# Patient Record
Sex: FEMALE | Race: WHITE | NOT HISPANIC OR LATINO | URBAN - METROPOLITAN AREA
[De-identification: names, ages, dates, MRNs, and addresses within clinical notes are randomized per-mention and may not be internally consistent; named-entity substitution may affect disease eponyms.]

---

## 2024-06-25 ENCOUNTER — HOSPITAL ENCOUNTER (EMERGENCY)
Facility: HOSPITAL | Age: 19
Discharge: HOME | End: 2024-06-25
Attending: EMERGENCY MEDICINE | Admitting: EMERGENCY MEDICINE
Payer: COMMERCIAL

## 2024-06-25 ENCOUNTER — APPOINTMENT (EMERGENCY)
Dept: RADIOLOGY | Facility: HOSPITAL | Age: 19
End: 2024-06-25
Payer: COMMERCIAL

## 2024-06-25 VITALS
OXYGEN SATURATION: 99 % | RESPIRATION RATE: 20 BRPM | DIASTOLIC BLOOD PRESSURE: 64 MMHG | WEIGHT: 126.54 LBS | HEART RATE: 82 BPM | SYSTOLIC BLOOD PRESSURE: 118 MMHG | TEMPERATURE: 98.6 F

## 2024-06-25 DIAGNOSIS — R01.1 NEWLY RECOGNIZED HEART MURMUR: ICD-10-CM

## 2024-06-25 DIAGNOSIS — R07.89 ATYPICAL CHEST PAIN: Primary | ICD-10-CM

## 2024-06-25 LAB
ANION GAP SERPL CALCULATED.3IONS-SCNC: 9 MMOL/L (ref 4–13)
BASOPHILS # BLD AUTO: 0.03 THOUSANDS/ÂΜL (ref 0–0.1)
BASOPHILS NFR BLD AUTO: 0 % (ref 0–1)
BILIRUB UR QL STRIP: NEGATIVE
BUN SERPL-MCNC: 10 MG/DL (ref 5–25)
CALCIUM SERPL-MCNC: 9.3 MG/DL (ref 8.4–10.2)
CARDIAC TROPONIN I PNL SERPL HS: <2 NG/L
CHLORIDE SERPL-SCNC: 103 MMOL/L (ref 96–108)
CLARITY UR: CLEAR
CO2 SERPL-SCNC: 26 MMOL/L (ref 21–32)
COLOR UR: YELLOW
CREAT SERPL-MCNC: 0.76 MG/DL (ref 0.6–1.3)
D DIMER PPP FEU-MCNC: <0.27 UG/ML FEU
EOSINOPHIL # BLD AUTO: 0.11 THOUSAND/ÂΜL (ref 0–0.61)
EOSINOPHIL NFR BLD AUTO: 2 % (ref 0–6)
ERYTHROCYTE [DISTWIDTH] IN BLOOD BY AUTOMATED COUNT: 11.4 % (ref 11.6–15.1)
EXT PREGNANCY TEST URINE: NEGATIVE
EXT. CONTROL: NORMAL
GFR SERPL CREATININE-BSD FRML MDRD: 112 ML/MIN/1.73SQ M
GLUCOSE SERPL-MCNC: 86 MG/DL (ref 65–140)
GLUCOSE UR STRIP-MCNC: NEGATIVE MG/DL
HCT VFR BLD AUTO: 40.2 % (ref 34.8–46.1)
HGB BLD-MCNC: 14.2 G/DL (ref 11.5–15.4)
HGB UR QL STRIP.AUTO: NEGATIVE
IMM GRANULOCYTES # BLD AUTO: 0.02 THOUSAND/UL (ref 0–0.2)
IMM GRANULOCYTES NFR BLD AUTO: 0 % (ref 0–2)
KETONES UR STRIP-MCNC: NEGATIVE MG/DL
LEUKOCYTE ESTERASE UR QL STRIP: NEGATIVE
LYMPHOCYTES # BLD AUTO: 2.3 THOUSANDS/ÂΜL (ref 0.6–4.47)
LYMPHOCYTES NFR BLD AUTO: 32 % (ref 14–44)
MCH RBC QN AUTO: 32.1 PG (ref 26.8–34.3)
MCHC RBC AUTO-ENTMCNC: 35.3 G/DL (ref 31.4–37.4)
MCV RBC AUTO: 91 FL (ref 82–98)
MONOCYTES # BLD AUTO: 0.8 THOUSAND/ÂΜL (ref 0.17–1.22)
MONOCYTES NFR BLD AUTO: 11 % (ref 4–12)
NEUTROPHILS # BLD AUTO: 4.02 THOUSANDS/ÂΜL (ref 1.85–7.62)
NEUTS SEG NFR BLD AUTO: 55 % (ref 43–75)
NITRITE UR QL STRIP: NEGATIVE
NRBC BLD AUTO-RTO: 0 /100 WBCS
PH UR STRIP.AUTO: 6.5 [PH] (ref 4.5–8)
PLATELET # BLD AUTO: 225 THOUSANDS/UL (ref 149–390)
PMV BLD AUTO: 10.9 FL (ref 8.9–12.7)
POTASSIUM SERPL-SCNC: 3.3 MMOL/L (ref 3.5–5.3)
PROT UR STRIP-MCNC: NEGATIVE MG/DL
RBC # BLD AUTO: 4.43 MILLION/UL (ref 3.81–5.12)
SODIUM SERPL-SCNC: 138 MMOL/L (ref 135–147)
SP GR UR STRIP.AUTO: 1.01 (ref 1–1.03)
UROBILINOGEN UR QL STRIP.AUTO: 0.2 E.U./DL
WBC # BLD AUTO: 7.28 THOUSAND/UL (ref 4.31–10.16)

## 2024-06-25 PROCEDURE — 93005 ELECTROCARDIOGRAM TRACING: CPT

## 2024-06-25 PROCEDURE — 84484 ASSAY OF TROPONIN QUANT: CPT | Performed by: EMERGENCY MEDICINE

## 2024-06-25 PROCEDURE — 85379 FIBRIN DEGRADATION QUANT: CPT | Performed by: EMERGENCY MEDICINE

## 2024-06-25 PROCEDURE — 80048 BASIC METABOLIC PNL TOTAL CA: CPT | Performed by: EMERGENCY MEDICINE

## 2024-06-25 PROCEDURE — 36415 COLL VENOUS BLD VENIPUNCTURE: CPT | Performed by: EMERGENCY MEDICINE

## 2024-06-25 PROCEDURE — 71046 X-RAY EXAM CHEST 2 VIEWS: CPT

## 2024-06-25 PROCEDURE — 81025 URINE PREGNANCY TEST: CPT | Performed by: EMERGENCY MEDICINE

## 2024-06-25 PROCEDURE — 99285 EMERGENCY DEPT VISIT HI MDM: CPT | Performed by: EMERGENCY MEDICINE

## 2024-06-25 PROCEDURE — 85025 COMPLETE CBC W/AUTO DIFF WBC: CPT | Performed by: EMERGENCY MEDICINE

## 2024-06-25 PROCEDURE — 81003 URINALYSIS AUTO W/O SCOPE: CPT

## 2024-06-25 NOTE — ED PROVIDER NOTES
"History  Chief Complaint   Patient presents with    Chest Pain     Chest pain for the past week that is intermittent and pt describes as pressure and sometimes sharp. Hx of same previously and prescribed steroid due to bronchial thickening and airway inflammation     A 20 yo female with no significant pmhx; presents with left sided chest pain for the past week.  Pain is colicky in nature, she has been unable to identify any exacerbating factors.  Pt denies associated fever, chills, cough, SOB, abd pain, N/V/D, peripheral edema and rashes.  Pt reports this has been an ongoing issue over the past several months.  Pt states she was initially seen a few months ago, diagnosed with \"bronchial thickening and airway inflammation\".  She was treated with a course of steroids and Abx, reporting some improvement until recently.        History provided by:  Patient and medical records      None       Past Medical History:   Diagnosis Date    Asthma        History reviewed. No pertinent surgical history.    History reviewed. No pertinent family history.  I have reviewed and agree with the history as documented.    E-Cigarette/Vaping    E-Cigarette Use Never User      E-Cigarette/Vaping Substances    Nicotine No     THC No     CBD No     Flavoring No     Other No     Unknown No      Social History     Tobacco Use    Smoking status: Never    Smokeless tobacco: Never   Vaping Use    Vaping status: Never Used   Substance Use Topics    Alcohol use: Yes     Alcohol/week: 3.0 standard drinks of alcohol     Types: 3 Cans of beer per week     Comment: mothly    Drug use: Not Currently       Review of Systems   Cardiovascular:  Positive for chest pain.   All other systems reviewed and are negative.      Physical Exam  Physical Exam  General Appearance: alert and oriented, nad, non toxic appearing  Skin:  Warm, dry, intact.  No cyanosis  HEENT: Atraumatic, normocephalic.  No eye drainage.  Normal hearing.  Moist mucous membranes.    Neck: " Supple, trachea midline  Cardiac: RRR; faint murmur appreciated.  No rub, gallops.  No pedal edema, 2+ pulses  Pulmonary: lungs CTAB; no wheezes, rales, rhonchi  Gastrointestinal: abdomen soft, nontender, nondistended; no guarding or rebound tenderness; good bowel sounds, no mass or bruits  Extremities:  No deformities.  No calf tenderness, no clubbing  Neuro:  no focal motor or sensory deficits, CN 2-12 grossly intact  Psych:  Normal mood and affect, normal judgement and insight      Vital Signs  ED Triage Vitals   Temperature Pulse Respirations Blood Pressure SpO2   06/25/24 1823 06/25/24 1821 06/25/24 1821 06/25/24 1821 06/25/24 1821   98.6 °F (37 °C) 81 12 133/92 100 %      Temp Source Heart Rate Source Patient Position - Orthostatic VS BP Location FiO2 (%)   06/25/24 1823 06/25/24 1821 06/25/24 1821 06/25/24 1821 --   Oral Monitor Lying Right arm       Pain Score       --                  Vitals:    06/25/24 1821 06/25/24 2049 06/25/24 2052   BP: 133/92 123/60 118/64   Pulse: 81 78 82   Patient Position - Orthostatic VS: Lying Lying Sitting         Visual Acuity      ED Medications  Medications - No data to display    Diagnostic Studies  Results Reviewed       Procedure Component Value Units Date/Time    Basic metabolic panel [[id number]]  (Abnormal) Collected: 06/25/24 1858    Lab Status: Final result Specimen: Blood from Arm, Left Updated: 06/25/24 1952     Sodium 138 mmol/L      Potassium 3.3 mmol/L      Chloride 103 mmol/L      CO2 26 mmol/L      ANION GAP 9 mmol/L      BUN 10 mg/dL      Creatinine 0.76 mg/dL      Glucose 86 mg/dL      Calcium 9.3 mg/dL      eGFR 112 ml/min/1.73sq m     Narrative:      National Kid[employer] Foundation guidelines for Chronic Kidney Disease (CKD):     Stage 1 with normal or high GFR (GFR > 90 mL/min/1.73 square meters)    Stage 2 Mild CKD (GFR = 60-89 mL/min/1.73 square meters)    Stage 3A Moderate CKD (GFR = 45-59 mL/min/1.73 square meters)    Stage 3B Moderate CKD (GFR =  30-44 mL/min/1.73 square meters)    Stage 4 Severe CKD (GFR = 15-29 mL/min/1.73 square meters)    Stage 5 End Stage CKD (GFR <15 mL/min/1.73 square meters)  Note: GFR calculation is accurate only with a steady state creatinine    D-Dimer [[id number]]  (Normal) Collected: 06/25/24 1858    Lab Status: Final result Specimen: Blood from Arm, Left Updated: 06/25/24 1940     D-Dimer, Quant <0.27 ug/ml FEU     HS Troponin 0hr (reflex protocol) [[id number]]  (Normal) Collected: 06/25/24 1858    Lab Status: Final result Specimen: Blood from Arm, Left Updated: 06/25/24 1927     hs TnI 0hr <2 ng/L     POCT pregnancy, urine [[id number]]  (Normal) Resulted: 06/25/24 1911    Lab Status: Final result Updated: 06/25/24 1911     EXT Preg Test, Ur Negative     Control Valid    Urine Macroscopic, POC [[id number]] Collected: 06/25/24 1909    Lab Status: Final result Specimen: Urine Updated: 06/25/24 1910     Color, UA Yellow     Clarity, UA Clear     pH, UA 6.5     Leukocytes, UA Negative     Nitrite, UA Negative     Protein, UA Negative mg/dl      Glucose, UA Negative mg/dl      Ketones, UA Negative mg/dl      Urobilinogen, UA 0.2 E.U./dl      Bilirubin, UA Negative     Occult Blood, UA Negative     Specific Gravity, UA 1.010    Narrative:      CLINITEK RESULT    CBC and differential [[id number]]  (Abnormal) Collected: 06/25/24 1858    Lab Status: Final result Specimen: Blood from Arm, Left Updated: 06/25/24 1906     WBC 7.28 Thousand/uL      RBC 4.43 Million/uL      Hemoglobin 14.2 g/dL      Hematocrit 40.2 %      MCV 91 fL      MCH 32.1 pg      MCHC 35.3 g/dL      RDW 11.4 %      MPV 10.9 fL      Platelets 225 Thousands/uL      nRBC 0 /100 WBCs      Segmented % 55 %      Immature Grans % 0 %      Lymphocytes % 32 %      Monocytes % 11 %      Eosinophils Relative 2 %      Basophils Relative 0 %      Absolute Neutrophils 4.02 Thousands/µL      Absolute Immature Grans 0.02 Thousand/uL      Absolute Lymphocytes 2.30 Thousands/µL       Absolute Monocytes 0.80 Thousand/µL      Eosinophils Absolute 0.11 Thousand/µL      Basophils Absolute 0.03 Thousands/µL                    XR chest 2 views   ED Interpretation by Maranda Salmon DO (06/25 2018)   No acute disease    Image independently interpreted by myself        Final Result by Diogo Vergara MD (06/26 0923)      No acute cardiopulmonary disease.            Workstation performed: [id number]                    Procedures  Procedures   ECG 12 Lead Documentation  Date/Time: today/date: 6/28/2024  Performed by: Maranda Salmon    ECG reviewed by me, the ED Provider: yes    Patient location:  ED   Previous ECG:  No old for comparison  Rate:  77  ECG rate assessment: normal    Rhythm: sinus rhythm    Ectopy:  none    QRS axis:  Normal  Intervals: normal   Q waves: None   ST segments:  Down sloping ST segments in II, III and aVF  T waves: normal      Impression: NSR with down sloping ST segments inferiorly       ED Course  ED Course as of 06/28/24 1106   Tue Jun 25, 2024   1937 hs TnI 0hr: <2   1944 D-Dimer, Quant: <0.27  Pulmonary embolism can be ruled out   2002 Remainder of labs WNL   2044 Pt and mother updated on lab results.  Unclear etiology for pt's symptoms, although work up is reassuring.  Given faint murmur appreciated on exam, will refer to cardiology for further evaluation.  Will also place order for outpatient echo.  Strict return precautions discussed.  Pt and mother in agreement.             HEART Risk Score      Flowsheet Row Most Recent Value   Heart Score Risk Calculator    History 0 Filed at: 06/25/2024 1940   ECG 1 Filed at: 06/25/2024 1940   Age 0 Filed at: 06/25/2024 1940   Risk Factors 0 Filed at: 06/25/2024 1940   Troponin 0 Filed at: 06/25/2024 1940   HEART Score 1 Filed at: 06/25/2024 1940                          SBIRT 22yo+      Flowsheet Row Most Recent Value   Initial Alcohol Screen: US AUDIT-C     1. How often do you have a drink containing alcohol? 3 Filed at:  06/25/2024 1828   2. How many drinks containing alcohol do you have on a typical day you are drinking?  2 Filed at: 06/25/2024 1828   3a. Male UNDER 65: How often do you have five or more drinks on one occasion? 0 Filed at: 06/25/2024 1828   3b. FEMALE Any Age, or MALE 65+: How often do you have 4 or more drinks on one occassion? 0 Filed at: 06/25/2024 1828   Audit-C Score 5 Filed at: 06/25/2024 1828   BERYL: How many times in the past year have you...    Used an illegal drug or used a prescription medication for non-medical reasons? Never Filed at: 06/25/2024 1828                      Medical Decision Making  A 18 yo female presents with chest pain, colicky in nature over the past week.  Denies all other associated symptoms.  EKG with ST changes inferiorly, however pt provided picture of prior EKG and similar ST changes noted.  Will proceed with labs to check for electrolyte abnormality, anemia, significant leukocytosis, myocarditis/pericarditis.  Will check dimer - image accordingly.    Amount and/or Complexity of Data Reviewed  Labs: ordered. Decision-making details documented in ED Course.  Radiology: ordered and independent interpretation performed.             Disposition  Final diagnoses:   Atypical chest pain   Newly recognized heart murmur     Time reflects when diagnosis was documented in both MDM as applicable and the Disposition within this note       Time User Action Codes Description Comment    6/25/2024  8:47 PM Maranda Salmon Add [R07.89] Atypical chest pain     6/25/2024  8:47 PM Maranda Salmon Add [R01.1] Heart murmur     6/25/2024  8:47 PM Maranda Salmon Remove [R01.1] Heart murmur     6/25/2024  8:47 PM Maranda Salmon Add [R01.1] Newly recognized heart murmur           ED Disposition       ED Disposition   Discharge    Condition   Stable    Date/Time   Tue Jun 25, 2024 2047    Comment   Hien Hutson discharge to home/self care.                   Follow-up Information       Follow up With Specialties  Details Why Contact Info Additional Information    [facility] Cardiology Schedule an appointment as soon as possible for a visit  For re-evaluation [address] [facility] [address] [phone] [facility], [address], [place], [id number] [phone]    [facility] Emergency Department Emergency Medicine Go to  If symptoms worsen [address] [facility] [place] [address] [phone] [facility] Emergency Department, [address] [place], [place], [address]            There are no discharge medications for this patient.      Outpatient Discharge Orders   Ambulatory Referral to Cardiology   Standing Status: Future Standing Exp. Date: 06/25/25      Echo complete w/ contrast if indicated   Standing Status: Future Standing Exp. Date: 06/25/28       PDMP Review       None            ED Provider  Electronically Signed by             Maranda Salmon DO  06/28/24 1984

## 2024-06-26 LAB
ATRIAL RATE: 77 BPM
P AXIS: 42 DEGREES
PR INTERVAL: 126 MS
QRS AXIS: 93 DEGREES
QRSD INTERVAL: 88 MS
QT INTERVAL: 378 MS
QTC INTERVAL: 427 MS
T WAVE AXIS: 17 DEGREES
VENTRICULAR RATE: 77 BPM

## 2024-06-26 PROCEDURE — 93010 ELECTROCARDIOGRAM REPORT: CPT | Performed by: INTERNAL MEDICINE
